# Patient Record
Sex: FEMALE | URBAN - METROPOLITAN AREA
[De-identification: names, ages, dates, MRNs, and addresses within clinical notes are randomized per-mention and may not be internally consistent; named-entity substitution may affect disease eponyms.]

---

## 2018-08-15 ENCOUNTER — APPOINTMENT (OUTPATIENT)
Age: 78
Setting detail: DERMATOLOGY
End: 2018-08-16

## 2018-08-15 DIAGNOSIS — L57.8 OTHER SKIN CHANGES DUE TO CHRONIC EXPOSURE TO NONIONIZING RADIATION: ICD-10-CM

## 2018-08-15 DIAGNOSIS — L81.4 OTHER MELANIN HYPERPIGMENTATION: ICD-10-CM

## 2018-08-15 DIAGNOSIS — L57.0 ACTINIC KERATOSIS: ICD-10-CM

## 2018-08-15 PROCEDURE — OTHER REASSURANCE: OTHER

## 2018-08-15 PROCEDURE — OTHER PRODUCT LINE (OFFICE PRODUCTS): OTHER

## 2018-08-15 PROCEDURE — 17004 DESTROY PREMAL LESIONS 15/>: CPT

## 2018-08-15 PROCEDURE — 99203 OFFICE O/P NEW LOW 30 MIN: CPT | Mod: 25

## 2018-08-15 PROCEDURE — OTHER LIQUID NITROGEN: OTHER

## 2018-08-15 ASSESSMENT — LOCATION ZONE DERM
LOCATION ZONE: HAND
LOCATION ZONE: LEG
LOCATION ZONE: ARM
LOCATION ZONE: ARM
LOCATION ZONE: TRUNK
LOCATION ZONE: FACE
LOCATION ZONE: FACE
LOCATION ZONE: HAND
LOCATION ZONE: NECK

## 2018-08-15 ASSESSMENT — LOCATION SIMPLE DESCRIPTION DERM
LOCATION SIMPLE: LEFT HAND
LOCATION SIMPLE: RIGHT HAND
LOCATION SIMPLE: LEFT PRETIBIAL REGION
LOCATION SIMPLE: RIGHT FOREARM
LOCATION SIMPLE: LEFT CHEEK
LOCATION SIMPLE: RIGHT SHOULDER
LOCATION SIMPLE: LEFT HAND
LOCATION SIMPLE: POSTERIOR NECK
LOCATION SIMPLE: RIGHT FOREHEAD
LOCATION SIMPLE: LEFT SHOULDER
LOCATION SIMPLE: LEFT FOREARM
LOCATION SIMPLE: RIGHT HAND
LOCATION SIMPLE: CHEST

## 2018-08-15 ASSESSMENT — LOCATION DETAILED DESCRIPTION DERM
LOCATION DETAILED: LEFT PROXIMAL DORSAL FOREARM
LOCATION DETAILED: RIGHT RADIAL DORSAL HAND
LOCATION DETAILED: LEFT ULNAR DORSAL HAND
LOCATION DETAILED: RIGHT LATERAL TRAPEZIAL NECK
LOCATION DETAILED: RIGHT POSTERIOR SHOULDER
LOCATION DETAILED: LEFT INFERIOR CENTRAL MALAR CHEEK
LOCATION DETAILED: LEFT POSTERIOR SHOULDER
LOCATION DETAILED: RIGHT PROXIMAL DORSAL FOREARM
LOCATION DETAILED: STERNAL NOTCH
LOCATION DETAILED: LEFT LATERAL TRAPEZIAL NECK
LOCATION DETAILED: RIGHT RADIAL DORSAL HAND
LOCATION DETAILED: LEFT DISTAL PRETIBIAL REGION
LOCATION DETAILED: STERNUM
LOCATION DETAILED: LEFT RADIAL DORSAL HAND
LOCATION DETAILED: RIGHT INFERIOR MEDIAL FOREHEAD

## 2018-08-15 NOTE — PROCEDURE: PRODUCT LINE (OFFICE PRODUCTS)
Name Of Product 3: MDC GLYCOLIC LOTION
Product 28 Units: 0
Product 18 Price (In Dollars - Numeric Only, No Special Characters Or $): 20.00
Allow Plan To Count Towards E/M Coding: Yes
Product 75 Price (In Dollars - Numeric Only, No Special Characters Or $): 0.00
Product 1 Price (In Dollars - Numeric Only, No Special Characters Or $): 38.00
Product 19 Application Directions: Shampoo daily for one week then as necessary to control
Product 7 Application Directions: Apply qam for dryness, redness and minor acne scarring
Product 7 Price (In Dollars - Numeric Only, No Special Characters Or $): 28.00
Product 2 Price (In Dollars - Numeric Only, No Special Characters Or $): 50.00
Product 16 Price (In Dollars - Numeric Only, No Special Characters Or $): 71
Product 3 Price (In Dollars - Numeric Only, No Special Characters Or $): 30.00
Name Of Product 18: Martin LOPEZ Shampoo
Product 13 Price (In Dollars - Numeric Only, No Special Characters Or $): 89.00
Name Of Product 14: Acne Crrection pads
Name Of Product 10: Acne pads
Name Of Product 6: Black mask
Name Of Product 11: Retin-A .05
Product 4 Application Directions: Apply sparingly qhs
Name Of Product 8: MDC BPO Wash 10%
Name Of Product 16: Anuja 24 eye repair complex
Product 8 Application Directions: Wash once or twice daily. Wash with hands and do not use a wash cloth
Name Of Product 19: Kertyol PSO shampoo
Product 11 Application Directions: Apply each night to face arms legs
Product 9 Application Directions: Apply qam
Product 18 Application Directions: Shampoo daily for one week, then as necessary to control
Name Of Product 15: Avene Clean-Ac Revival
Product 1 Application Directions: Wash face with beta cleanser instead of soap
Product 5 Application Directions: Apply qhs
Product 14 Application Directions: Apply at night. Aply for 1-2 hours and wash off. Use once or twice weekly.
Name Of Product 20: Dermanail
Product 10 Application Directions: Apply to face at night once a week
Detail Level: Simple
Name Of Product 12: MDC 10 % BPO gel
Product 11 Price (In Dollars - Numeric Only, No Special Characters Or $): 60.00
Name Of Product 2: Retin-A .025% cream
Product 2 Application Directions: Apply very sparingly to face every night. Apply to leg every night and apply vaseline on top.
Name Of Product 17: Nectifirm
Product 15 Price (In Dollars - Numeric Only, No Special Characters Or $): 56.00
Name Of Product 1: Beta cleanser
Product 5 Price (In Dollars - Numeric Only, No Special Characters Or $): 90.00
Product 3 Application Directions: Apply once or twice daily to chest, arms
Product 6 Application Directions: Apply to face for 30 minutes and wash off. Repeat once a week
Product 13 Application Directions: Apply to neck qhs
Name Of Product 7: Cicalfate
Product 6 Price (In Dollars - Numeric Only, No Special Characters Or $): 35.00
Name Of Product 5: HQRA
Name Of Product 9: B5 gel
Product 8 Price (In Dollars - Numeric Only, No Special Characters Or $): 19.00
Product 2 Units: 1
Product 9 Price (In Dollars - Numeric Only, No Special Characters Or $): 37.00
Name Of Product 4: Retin-A 0.1

## 2018-08-15 NOTE — PROCEDURE: REASSURANCE
Detail Level: Zone
Additional Note: Sunscreen and moisturizers of choice
Include Location In Plan?: No

## 2018-08-15 NOTE — PROCEDURE: LIQUID NITROGEN
Post-Care Instructions: I reviewed with the patient in detail post-care instructions. Patient is to wear sunprotection, and avoid picking at any of the treated lesions. Pt may apply Vaseline to crusted or scabbing areas.
Total Number Of Aks Treated: 15
Render Post-Care Instructions In Note?: no
Duration Of Freeze Thaw-Cycle (Seconds): 0
Detail Level: Generalized
Consent: The patient's consent was obtained including but not limited to risks of crusting, scabbing, blistering, scarring, darker or lighter pigmentary change, recurrence, incomplete removal and infection.

## 2018-08-15 NOTE — HPI: UPPER BODY SKIN CHECK
How Severe Are Your Spot(S)?: moderate
What Is The Reason For Today's Visit?: Skin Lesions
Additional History: Notes places to upper body-lt cheek/rt fingers/rt lower leg